# Patient Record
Sex: MALE | Race: WHITE | NOT HISPANIC OR LATINO | URBAN - NONMETROPOLITAN AREA
[De-identification: names, ages, dates, MRNs, and addresses within clinical notes are randomized per-mention and may not be internally consistent; named-entity substitution may affect disease eponyms.]

---

## 2017-07-12 ENCOUNTER — FOLLOW UP ESTABLISHED (OUTPATIENT)
Dept: URBAN - NONMETROPOLITAN AREA CLINIC 13 | Facility: CLINIC | Age: 72
End: 2017-07-12
Payer: MEDICARE

## 2017-07-12 PROCEDURE — 92014 COMPRE OPH EXAM EST PT 1/>: CPT | Performed by: OPHTHALMOLOGY

## 2017-07-12 PROCEDURE — 92134 CPTRZ OPH DX IMG PST SGM RTA: CPT | Performed by: OPHTHALMOLOGY

## 2017-07-12 ASSESSMENT — INTRAOCULAR PRESSURE
OS: 13
OD: 11

## 2017-11-08 ENCOUNTER — FOLLOW UP ESTABLISHED (OUTPATIENT)
Dept: URBAN - NONMETROPOLITAN AREA CLINIC 13 | Facility: CLINIC | Age: 72
End: 2017-11-08
Payer: MEDICARE

## 2017-11-08 DIAGNOSIS — H26.493 OTHER SECONDARY CATARACT, BILATERAL: ICD-10-CM

## 2017-11-08 DIAGNOSIS — H16.223 KERATOCONJUNCTIVITIS SICCA, BILATERAL: ICD-10-CM

## 2017-11-08 DIAGNOSIS — H35.343 MACULAR CYST, HOLE, OR PSEUDOHOLE, BILATERAL: Primary | ICD-10-CM

## 2017-11-08 PROCEDURE — 92134 CPTRZ OPH DX IMG PST SGM RTA: CPT | Performed by: OPTOMETRIST

## 2017-11-08 PROCEDURE — 99214 OFFICE O/P EST MOD 30 MIN: CPT | Performed by: OPTOMETRIST

## 2017-11-08 ASSESSMENT — INTRAOCULAR PRESSURE
OS: 15
OD: 14

## 2018-02-07 ENCOUNTER — FOLLOW UP ESTABLISHED (OUTPATIENT)
Dept: URBAN - NONMETROPOLITAN AREA CLINIC 13 | Facility: CLINIC | Age: 73
End: 2018-02-07
Payer: MEDICARE

## 2018-02-07 PROCEDURE — 92134 CPTRZ OPH DX IMG PST SGM RTA: CPT | Performed by: OPHTHALMOLOGY

## 2018-02-07 PROCEDURE — 92014 COMPRE OPH EXAM EST PT 1/>: CPT | Performed by: OPHTHALMOLOGY

## 2018-02-07 ASSESSMENT — INTRAOCULAR PRESSURE
OD: 17
OS: 20
OD: 13
OS: 15

## 2018-02-09 ENCOUNTER — FOLLOW UP ESTABLISHED (OUTPATIENT)
Dept: URBAN - NONMETROPOLITAN AREA CLINIC 13 | Facility: CLINIC | Age: 73
End: 2018-02-09
Payer: MEDICARE

## 2018-02-09 DIAGNOSIS — H26.491 OTHER SECONDARY CATARACT, RIGHT EYE: Primary | ICD-10-CM

## 2018-02-09 PROCEDURE — 92012 INTRM OPH EXAM EST PATIENT: CPT | Performed by: OPHTHALMOLOGY

## 2018-02-09 ASSESSMENT — VISUAL ACUITY
OD: 20/70
OS: 20/60

## 2018-02-09 ASSESSMENT — INTRAOCULAR PRESSURE
OS: 12
OD: 11

## 2018-05-21 ENCOUNTER — FOLLOW UP ESTABLISHED (OUTPATIENT)
Dept: URBAN - NONMETROPOLITAN AREA CLINIC 13 | Facility: CLINIC | Age: 73
End: 2018-05-21
Payer: MEDICARE

## 2018-05-21 DIAGNOSIS — T15.12XA FOREIGN BODY IN CONJUNCTIVAL SAC, LEFT EYE, INITIAL ENCOUNTER: Primary | ICD-10-CM

## 2018-05-21 PROCEDURE — 65210 REMOVE FOREIGN BODY FROM EYE: CPT | Performed by: OPTOMETRIST

## 2018-05-21 ASSESSMENT — INTRAOCULAR PRESSURE
OD: 14
OS: 14

## 2018-06-11 ENCOUNTER — Encounter (OUTPATIENT)
Dept: URBAN - NONMETROPOLITAN AREA CLINIC 13 | Facility: CLINIC | Age: 73
End: 2018-06-11
Payer: MEDICARE

## 2018-06-11 DIAGNOSIS — Z01.818 ENCOUNTER FOR OTHER PREPROCEDURAL EXAMINATION: Primary | ICD-10-CM

## 2018-06-11 PROCEDURE — 99213 OFFICE O/P EST LOW 20 MIN: CPT | Performed by: PHYSICIAN ASSISTANT

## 2018-06-18 ENCOUNTER — SURGERY (OUTPATIENT)
Dept: URBAN - NONMETROPOLITAN AREA SURGERY 4 | Facility: SURGERY | Age: 73
End: 2018-06-18
Payer: MEDICARE

## 2018-06-18 PROCEDURE — 66821 AFTER CATARACT LASER SURGERY: CPT | Performed by: OPHTHALMOLOGY

## 2018-06-25 ENCOUNTER — POST OP (OUTPATIENT)
Dept: URBAN - NONMETROPOLITAN AREA CLINIC 13 | Facility: CLINIC | Age: 73
End: 2018-06-25

## 2018-06-25 DIAGNOSIS — Z96.1 PRESENCE OF INTRAOCULAR LENS: Primary | ICD-10-CM

## 2018-06-25 PROCEDURE — 99024 POSTOP FOLLOW-UP VISIT: CPT | Performed by: OPTOMETRIST

## 2018-06-25 ASSESSMENT — VISUAL ACUITY: OD: 20/80

## 2019-08-08 ENCOUNTER — FOLLOW UP ESTABLISHED (OUTPATIENT)
Dept: URBAN - NONMETROPOLITAN AREA CLINIC 13 | Facility: CLINIC | Age: 74
End: 2019-08-08
Payer: MEDICARE

## 2019-08-08 PROCEDURE — 92014 COMPRE OPH EXAM EST PT 1/>: CPT | Performed by: OPTOMETRIST

## 2019-08-08 ASSESSMENT — INTRAOCULAR PRESSURE
OS: 11
OD: 12

## 2019-08-08 ASSESSMENT — VISUAL ACUITY
OS: 20/50
OD: 20/60

## 2020-08-25 ENCOUNTER — FOLLOW UP ESTABLISHED (OUTPATIENT)
Dept: URBAN - NONMETROPOLITAN AREA CLINIC 13 | Facility: CLINIC | Age: 75
End: 2020-08-25
Payer: MEDICARE

## 2020-08-25 PROCEDURE — 92014 COMPRE OPH EXAM EST PT 1/>: CPT | Performed by: OPTOMETRIST

## 2020-08-25 PROCEDURE — 92134 CPTRZ OPH DX IMG PST SGM RTA: CPT | Performed by: OPTOMETRIST

## 2020-08-25 ASSESSMENT — VISUAL ACUITY
OD: 20/70
OS: 20/60

## 2020-08-25 ASSESSMENT — INTRAOCULAR PRESSURE
OD: 13
OS: 12

## 2021-08-26 ENCOUNTER — OFFICE VISIT (OUTPATIENT)
Dept: URBAN - NONMETROPOLITAN AREA CLINIC 13 | Facility: CLINIC | Age: 76
End: 2021-08-26
Payer: MEDICARE

## 2021-08-26 DIAGNOSIS — H44.2B3: Primary | ICD-10-CM

## 2021-08-26 PROCEDURE — 99213 OFFICE O/P EST LOW 20 MIN: CPT | Performed by: OPTOMETRIST

## 2021-08-26 ASSESSMENT — VISUAL ACUITY
OS: 20/60
OD: 20/100

## 2021-08-26 ASSESSMENT — INTRAOCULAR PRESSURE
OD: 11
OS: 10

## 2021-08-26 NOTE — IMPRESSION/PLAN
Impression: Keratoconjunctivitis sicca, bilateral: P56.351. Plan: Consistent use of artificial tears is indicated for dryness noted.

## 2021-10-07 ENCOUNTER — OFFICE VISIT (OUTPATIENT)
Dept: URBAN - NONMETROPOLITAN AREA CLINIC 13 | Facility: CLINIC | Age: 76
End: 2021-10-07
Payer: MEDICARE

## 2021-10-07 PROCEDURE — 99204 OFFICE O/P NEW MOD 45 MIN: CPT | Performed by: OPHTHALMOLOGY

## 2021-10-07 PROCEDURE — 92134 CPTRZ OPH DX IMG PST SGM RTA: CPT | Performed by: OPHTHALMOLOGY

## 2021-10-07 RX ORDER — PREDNISOLONE ACETATE 10 MG/ML
1 % SUSPENSION/ DROPS OPHTHALMIC
Qty: 10 | Refills: 2 | Status: ACTIVE
Start: 2021-10-07

## 2021-10-07 ASSESSMENT — INTRAOCULAR PRESSURE
OS: 15
OD: 19

## 2021-10-07 NOTE — IMPRESSION/PLAN
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 Plan: Retina attached s/p PPV/laser OD and s/p PPV/scleral buckle OS - stable. Retina attached.  RD precautions emphasized

## 2021-11-18 ENCOUNTER — OFFICE VISIT (OUTPATIENT)
Dept: URBAN - NONMETROPOLITAN AREA CLINIC 13 | Facility: CLINIC | Age: 76
End: 2021-11-18
Payer: MEDICARE

## 2021-11-18 DIAGNOSIS — T15.01XA FOREIGN BODY IN CORNEA, RIGHT EYE, INITIAL ENCOUNTER: ICD-10-CM

## 2021-11-18 DIAGNOSIS — H33.053 TOTAL RETINAL DETACHMENT, BILATERAL: ICD-10-CM

## 2021-11-18 PROCEDURE — 65222 REMOVE FOREIGN BODY FROM EYE: CPT | Performed by: OPHTHALMOLOGY

## 2021-11-18 PROCEDURE — 99214 OFFICE O/P EST MOD 30 MIN: CPT | Performed by: OPHTHALMOLOGY

## 2021-11-18 PROCEDURE — 92134 CPTRZ OPH DX IMG PST SGM RTA: CPT | Performed by: OPHTHALMOLOGY

## 2021-11-18 RX ORDER — OFLOXACIN 3 MG/ML
0.3 % SOLUTION/ DROPS OPHTHALMIC
Qty: 5 | Refills: 0 | Status: ACTIVE
Start: 2021-11-18

## 2021-11-18 RX ORDER — DICLOFENAC SODIUM 1 MG/ML
0.1 % SOLUTION/ DROPS OPHTHALMIC
Qty: 5 | Refills: 0 | Status: ACTIVE
Start: 2021-11-18

## 2021-11-18 ASSESSMENT — INTRAOCULAR PRESSURE
OD: 16
OS: 14

## 2021-11-18 NOTE — IMPRESSION/PLAN
Impression: Foreign body in cornea, right eye, initial encounter: T15.01XA. Plan: Corneal metal foreign body - removed today without complication. R/B/A discussed. Start Ofloxacin QID OD x 1 week and then DC.  Return precautions emphasized

## 2021-11-18 NOTE — IMPRESSION/PLAN
Impression: Retinal edema: H35.81.  Plan: Retina edema OD - persists, continue PF QID OD and add diclofenac QID OD

## 2022-01-21 ENCOUNTER — PROCEDURE (OUTPATIENT)
Dept: URBAN - NONMETROPOLITAN AREA CLINIC 13 | Facility: CLINIC | Age: 77
End: 2022-01-21
Payer: MEDICARE

## 2022-01-21 DIAGNOSIS — H35.81 RETINAL EDEMA: Primary | ICD-10-CM

## 2022-01-21 PROCEDURE — 92134 CPTRZ OPH DX IMG PST SGM RTA: CPT | Performed by: OPHTHALMOLOGY

## 2022-01-21 PROCEDURE — 92014 COMPRE OPH EXAM EST PT 1/>: CPT | Performed by: OPHTHALMOLOGY

## 2022-01-21 ASSESSMENT — INTRAOCULAR PRESSURE
OS: 16
OD: 18

## 2022-01-21 NOTE — IMPRESSION/PLAN
Impression: Retinal edema: H35.81. Plan: Retina edema OD - persists, patient self-discontinued PF and diclofenac OD as saw no improvement in visual acuity. Understands that there may be irreversible vision loss without treatment. Patient prefers observation and conservative management given limited visual acuity.  Observe and follow-up in 4 months for exam, sooner PRN

## 2022-05-19 ENCOUNTER — OFFICE VISIT (OUTPATIENT)
Dept: URBAN - NONMETROPOLITAN AREA CLINIC 13 | Facility: CLINIC | Age: 77
End: 2022-05-19
Payer: MEDICARE

## 2022-05-19 DIAGNOSIS — H35.81 RETINAL EDEMA: Primary | ICD-10-CM

## 2022-05-19 DIAGNOSIS — H33.053 TOTAL RETINAL DETACHMENT, BILATERAL: ICD-10-CM

## 2022-05-19 PROCEDURE — 92134 CPTRZ OPH DX IMG PST SGM RTA: CPT | Performed by: OPHTHALMOLOGY

## 2022-05-19 PROCEDURE — 92014 COMPRE OPH EXAM EST PT 1/>: CPT | Performed by: OPHTHALMOLOGY

## 2022-05-19 RX ORDER — PREDNISOLONE ACETATE 10 MG/ML
1 % SUSPENSION/ DROPS OPHTHALMIC
Qty: 10 | Refills: 2 | Status: INACTIVE
Start: 2022-05-19 | End: 2022-05-19

## 2022-05-19 RX ORDER — OFLOXACIN 3 MG/ML
0.3 % SOLUTION/ DROPS OPHTHALMIC
Qty: 5 | Refills: 0 | Status: INACTIVE
Start: 2022-05-19 | End: 2022-05-19

## 2022-05-19 RX ORDER — DICLOFENAC SODIUM 1 MG/ML
0.1 % SOLUTION/ DROPS OPHTHALMIC
Qty: 5 | Refills: 0 | Status: INACTIVE
Start: 2022-05-19 | End: 2022-05-19

## 2022-05-19 ASSESSMENT — INTRAOCULAR PRESSURE
OS: 18
OD: 20

## 2022-05-19 NOTE — IMPRESSION/PLAN
Impression: Retinal edema: H35.81. Plan: Retina edema OD - persists, patient self-discontinued PF and diclofenac OD as saw no improvement in visual acuity. Understands that there may be irreversible vision loss without treatment. Patient prefers observation and conservative management given limited visual acuity.  Observe and follow-up in 6 months for exam, sooner PRN

## 2022-12-16 ENCOUNTER — OFFICE VISIT (OUTPATIENT)
Dept: URBAN - NONMETROPOLITAN AREA CLINIC 13 | Facility: CLINIC | Age: 77
End: 2022-12-16
Payer: MEDICARE

## 2022-12-16 DIAGNOSIS — H35.81 RETINAL EDEMA: Primary | ICD-10-CM

## 2022-12-16 PROCEDURE — 92134 CPTRZ OPH DX IMG PST SGM RTA: CPT | Performed by: OPHTHALMOLOGY

## 2022-12-16 PROCEDURE — 92014 COMPRE OPH EXAM EST PT 1/>: CPT | Performed by: OPHTHALMOLOGY

## 2022-12-16 ASSESSMENT — INTRAOCULAR PRESSURE
OD: 14
OS: 14

## 2022-12-16 NOTE — IMPRESSION/PLAN
Impression: Retinal edema: H35.81. Plan: Retina edema OD - persists, prior use of topicals worsening vision and > worsening on oct mac.  discussed options of observation vs seeing retina again for possible IV injections. patient reports being open to injections if might help preserve or improve vision.   will set up follow up with retina

## 2023-01-26 ENCOUNTER — OFFICE VISIT (OUTPATIENT)
Dept: URBAN - NONMETROPOLITAN AREA CLINIC 13 | Facility: CLINIC | Age: 78
End: 2023-01-26
Payer: MEDICARE

## 2023-01-26 DIAGNOSIS — H35.81 RETINAL EDEMA: Primary | ICD-10-CM

## 2023-01-26 DIAGNOSIS — H33.053 TOTAL RETINAL DETACHMENT, BILATERAL: ICD-10-CM

## 2023-01-26 PROCEDURE — 92014 COMPRE OPH EXAM EST PT 1/>: CPT | Performed by: OPHTHALMOLOGY

## 2023-01-26 PROCEDURE — 92134 CPTRZ OPH DX IMG PST SGM RTA: CPT | Performed by: OPHTHALMOLOGY

## 2023-01-26 ASSESSMENT — INTRAOCULAR PRESSURE
OD: 15
OS: 15

## 2023-01-26 NOTE — IMPRESSION/PLAN
Impression: Retinal edema: H35.81. Plan: Retina edema OD - persists but stable. Discussed options 1) Intravitreal steroid vs 2) gtts vs 3) Observation - after discussion of R/B/A and potential for irreversible vision loss, patient elects continued observation given stability and limited visual acuity secondary to retinal detachment.  Observe and follow-up in 6 months for exam, sooner PRN

## 2023-07-13 ENCOUNTER — OFFICE VISIT (OUTPATIENT)
Dept: URBAN - NONMETROPOLITAN AREA CLINIC 13 | Facility: CLINIC | Age: 78
End: 2023-07-13
Payer: MEDICARE

## 2023-07-13 DIAGNOSIS — H35.81 RETINAL EDEMA: Primary | ICD-10-CM

## 2023-07-13 DIAGNOSIS — H33.053 TOTAL RETINAL DETACHMENT, BILATERAL: ICD-10-CM

## 2023-07-13 PROCEDURE — 92134 CPTRZ OPH DX IMG PST SGM RTA: CPT | Performed by: OPHTHALMOLOGY

## 2023-07-13 PROCEDURE — 99214 OFFICE O/P EST MOD 30 MIN: CPT | Performed by: OPHTHALMOLOGY

## 2023-07-13 ASSESSMENT — INTRAOCULAR PRESSURE
OD: 12
OS: 10

## 2023-07-13 NOTE — IMPRESSION/PLAN
Impression: Retinal edema: H35.81. Plan: Retina edema OD - persists but stable. Discussed options 1) Intravitreal steroid vs 2) gtts vs 3) Observation - after discussion of R/B/A and potential for irreversible vision loss, patient elects continued observation given stability and limited visual acuity secondary to retinal detachment.  Observe and follow-up in 12 months for exam, sooner PRN

## 2024-02-02 ENCOUNTER — OFFICE VISIT (OUTPATIENT)
Dept: URBAN - NONMETROPOLITAN AREA CLINIC 13 | Facility: CLINIC | Age: 79
End: 2024-02-02
Payer: MEDICARE

## 2024-02-02 DIAGNOSIS — H33.053 TOTAL RETINAL DETACHMENT, BILATERAL: Primary | ICD-10-CM

## 2024-02-02 PROCEDURE — 92250 FUNDUS PHOTOGRAPHY W/I&R: CPT | Performed by: OPTOMETRIST

## 2024-02-02 PROCEDURE — 92134 CPTRZ OPH DX IMG PST SGM RTA: CPT | Performed by: OPTOMETRIST

## 2024-02-02 PROCEDURE — 92014 COMPRE OPH EXAM EST PT 1/>: CPT | Performed by: OPTOMETRIST

## 2024-02-02 ASSESSMENT — INTRAOCULAR PRESSURE
OD: 16
OS: 16

## 2024-08-22 ENCOUNTER — OFFICE VISIT (OUTPATIENT)
Dept: URBAN - NONMETROPOLITAN AREA CLINIC 13 | Facility: CLINIC | Age: 79
End: 2024-08-22
Payer: MEDICARE

## 2024-08-22 DIAGNOSIS — H33.053 TOTAL RETINAL DETACHMENT, BILATERAL: ICD-10-CM

## 2024-08-22 DIAGNOSIS — H35.81 RETINAL EDEMA: Primary | ICD-10-CM

## 2024-08-22 PROCEDURE — 92134 CPTRZ OPH DX IMG PST SGM RTA: CPT | Performed by: OPHTHALMOLOGY

## 2024-08-22 PROCEDURE — 92014 COMPRE OPH EXAM EST PT 1/>: CPT | Performed by: OPHTHALMOLOGY

## 2024-08-22 ASSESSMENT — INTRAOCULAR PRESSURE
OS: 14
OD: 15

## 2025-08-21 ENCOUNTER — OFFICE VISIT (OUTPATIENT)
Dept: URBAN - NONMETROPOLITAN AREA CLINIC 13 | Facility: CLINIC | Age: 80
End: 2025-08-21
Payer: MEDICARE

## 2025-08-21 DIAGNOSIS — H33.053 TOTAL RETINAL DETACHMENT, BILATERAL: Primary | ICD-10-CM

## 2025-08-21 DIAGNOSIS — H35.81 RETINAL EDEMA: ICD-10-CM

## 2025-08-21 PROCEDURE — 99214 OFFICE O/P EST MOD 30 MIN: CPT | Performed by: OPHTHALMOLOGY

## 2025-08-21 PROCEDURE — 92134 CPTRZ OPH DX IMG PST SGM RTA: CPT | Performed by: OPHTHALMOLOGY

## 2025-08-21 ASSESSMENT — INTRAOCULAR PRESSURE
OD: 14
OS: 14